# Patient Record
Sex: MALE | Race: WHITE
[De-identification: names, ages, dates, MRNs, and addresses within clinical notes are randomized per-mention and may not be internally consistent; named-entity substitution may affect disease eponyms.]

---

## 2018-10-30 ENCOUNTER — HOSPITAL ENCOUNTER (EMERGENCY)
Dept: HOSPITAL 92 - ERS | Age: 63
Discharge: HOME | End: 2018-10-30
Payer: SELF-PAY

## 2018-10-30 ENCOUNTER — HOSPITAL ENCOUNTER (EMERGENCY)
Dept: HOSPITAL 18 - NAV ERS | Age: 63
Discharge: ANOTHER HOSPITAL | End: 2018-10-30
Payer: SELF-PAY

## 2018-10-30 DIAGNOSIS — Z79.899: ICD-10-CM

## 2018-10-30 DIAGNOSIS — S00.03XA: ICD-10-CM

## 2018-10-30 DIAGNOSIS — I10: ICD-10-CM

## 2018-10-30 DIAGNOSIS — Z79.82: ICD-10-CM

## 2018-10-30 DIAGNOSIS — S22.42XA: Primary | ICD-10-CM

## 2018-10-30 DIAGNOSIS — E78.5: ICD-10-CM
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
## 2018-10-30 LAB
ALBUMIN SERPL BCG-MCNC: 4.5 G/DL (ref 3.4–4.8)
ALP SERPL-CCNC: 82 U/L (ref 40–150)
ALT SERPL W P-5'-P-CCNC: 38 U/L (ref 8–55)
ANION GAP SERPL CALC-SCNC: 15 MMOL/L (ref 10–20)
AST SERPL-CCNC: 31 U/L (ref 5–34)
BASOPHILS # BLD AUTO: 0.1 THOU/UL (ref 0–0.2)
BASOPHILS NFR BLD AUTO: 0.7 % (ref 0–1)
BILIRUB SERPL-MCNC: 0.5 MG/DL (ref 0.2–1.2)
BUN SERPL-MCNC: 15 MG/DL (ref 8.4–25.7)
CALCIUM SERPL-MCNC: 9.8 MG/DL (ref 7.8–10.44)
CHLORIDE SERPL-SCNC: 105 MMOL/L (ref 98–107)
CO2 SERPL-SCNC: 23 MMOL/L (ref 23–31)
CREAT CL PREDICTED SERPL C-G-VRATE: 0 ML/MIN (ref 70–130)
EOSINOPHIL # BLD AUTO: 0.1 THOU/UL (ref 0–0.7)
EOSINOPHIL NFR BLD AUTO: 0.5 % (ref 0–10)
GLOBULIN SER CALC-MCNC: 3.4 G/DL (ref 2.4–3.5)
GLUCOSE SERPL-MCNC: 115 MG/DL (ref 80–115)
HGB BLD-MCNC: 14.8 G/DL (ref 14–18)
INR PPP: 0.9
LYMPHOCYTES # BLD AUTO: 1.7 THOU/UL (ref 1.2–3.4)
LYMPHOCYTES NFR BLD AUTO: 13.7 % (ref 21–51)
MCH RBC QN AUTO: 28.5 PG (ref 27–31)
MCV RBC AUTO: 85.5 FL (ref 78–98)
MONOCYTES # BLD AUTO: 0.6 THOU/UL (ref 0.11–0.59)
MONOCYTES NFR BLD AUTO: 4.9 % (ref 0–10)
NEUTROPHILS # BLD AUTO: 9.8 THOU/UL (ref 1.4–6.5)
NEUTROPHILS NFR BLD AUTO: 80.2 % (ref 42–75)
PLATELET # BLD AUTO: 234 THOU/UL (ref 130–400)
POTASSIUM SERPL-SCNC: 3.8 MMOL/L (ref 3.5–5.1)
PROTHROMBIN TIME: 12.7 SEC (ref 12–14.7)
RBC # BLD AUTO: 5.19 MILL/UL (ref 4.7–6.1)
SODIUM SERPL-SCNC: 139 MMOL/L (ref 136–145)
WBC # BLD AUTO: 12.2 THOU/UL (ref 4.8–10.8)

## 2018-10-30 PROCEDURE — 85025 COMPLETE CBC W/AUTO DIFF WBC: CPT

## 2018-10-30 PROCEDURE — 96361 HYDRATE IV INFUSION ADD-ON: CPT

## 2018-10-30 PROCEDURE — 96374 THER/PROPH/DIAG INJ IV PUSH: CPT

## 2018-10-30 PROCEDURE — 96375 TX/PRO/DX INJ NEW DRUG ADDON: CPT

## 2018-10-30 PROCEDURE — 94640 AIRWAY INHALATION TREATMENT: CPT

## 2018-10-30 PROCEDURE — 80053 COMPREHEN METABOLIC PANEL: CPT

## 2018-10-30 PROCEDURE — 71250 CT THORAX DX C-: CPT

## 2018-10-30 PROCEDURE — 85610 PROTHROMBIN TIME: CPT

## 2018-10-30 PROCEDURE — 70450 CT HEAD/BRAIN W/O DYE: CPT
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
## 2019-10-03 ENCOUNTER — HOSPITAL ENCOUNTER (OUTPATIENT)
Dept: HOSPITAL 92 - BICMRI | Age: 64
Discharge: HOME | End: 2019-10-03
Attending: FAMILY MEDICINE
Payer: MEDICARE

## 2019-10-03 DIAGNOSIS — G44.319: Primary | ICD-10-CM

## 2019-10-03 LAB — ESTIMATED GFR-MDRD - POC: (no result)

## 2019-10-03 PROCEDURE — 82565 ASSAY OF CREATININE: CPT

## 2019-10-03 PROCEDURE — 70553 MRI BRAIN STEM W/O & W/DYE: CPT

## 2019-10-03 NOTE — MRI
MRI BRAIN WITH AND WITHOUT CONTRAST:

 

Date:  10/03/19

 

INDICATION:

Post-traumatic headache. 

 

Reference made to 10/30/18 head CT. 

 

FINDINGS:

There is no ventriculomegaly, mass effect, midline shift, or acute territorial infarction. No hemorrh
agic susceptibility of the brain parenchyma is present. No significant signal abnormalities are demon
strated. No pathologic intra-axial enhancement. Mild mucosal thickening of the paranasal sinuses. The
re is motion artifact that limits detail. 

 

IMPRESSION:

No acute intracranial abnormalities. 

 

 

POS: [facility]

## 2020-01-03 ENCOUNTER — HOSPITAL ENCOUNTER (OUTPATIENT)
Dept: HOSPITAL 92 - SLEEPLAB | Age: 65
Discharge: HOME | End: 2020-01-03
Attending: FAMILY MEDICINE
Payer: MEDICARE

## 2020-01-03 DIAGNOSIS — G47.10: ICD-10-CM

## 2020-01-03 DIAGNOSIS — J34.2: ICD-10-CM

## 2020-01-03 DIAGNOSIS — R35.1: ICD-10-CM

## 2020-01-03 DIAGNOSIS — R06.83: ICD-10-CM

## 2020-01-03 DIAGNOSIS — G47.33: Primary | ICD-10-CM

## 2020-01-03 DIAGNOSIS — I10: ICD-10-CM

## 2020-01-03 DIAGNOSIS — G47.00: ICD-10-CM

## 2020-01-03 PROCEDURE — 95811 POLYSOM 6/>YRS CPAP 4/> PARM: CPT

## 2021-01-26 ENCOUNTER — HOSPITAL ENCOUNTER (OUTPATIENT)
Dept: HOSPITAL 92 - NM | Age: 66
Discharge: HOME | End: 2021-01-26
Attending: INTERNAL MEDICINE
Payer: MEDICARE

## 2021-01-26 DIAGNOSIS — R10.11: Primary | ICD-10-CM

## 2021-01-26 PROCEDURE — A9537 TC99M MEBROFENIN: HCPCS

## 2021-01-26 PROCEDURE — 78227 HEPATOBIL SYST IMAGE W/DRUG: CPT

## 2021-01-26 NOTE — NM
EXAM:

NM Hida Scan W Drug



PROVIDED CLINICAL HISTORY:

Right upper quadrant abdominal pain. Bloating and diarrhea for 4 months.



COMPARISON:

7/20/2016



FINDINGS:

There is normal uptake and excretion of radiotracer by the liver. Gallbladder activity is faintly vis
ualized by 10 minutes with increasing activity in the gallbladder imaging up to 60 minutes. After

60 minutes of imaging, 8 ounces of ensure was administered. A gallbladder ejection fraction of 94% wa
s obtained. A normal gallbladder ejection fraction is greater than 33%. Gallbladder ejection

fraction on prior study was 95%.



IMPRESSION:



1. No evidence of a cystic or common duct obstruction.

2. Normal gallbladder ejection fraction.



Reported By: Ted Landers 

Electronically Signed:  1/26/2021 11:41 AM

## 2022-07-28 ENCOUNTER — HOSPITAL ENCOUNTER (OUTPATIENT)
Dept: HOSPITAL 92 - BICRAD | Age: 67
Discharge: HOME | End: 2022-07-28
Attending: FAMILY MEDICINE
Payer: MEDICARE

## 2022-07-28 DIAGNOSIS — R10.13: Primary | ICD-10-CM

## 2022-07-28 PROCEDURE — 71046 X-RAY EXAM CHEST 2 VIEWS: CPT

## 2024-05-02 ENCOUNTER — HOSPITAL ENCOUNTER (INPATIENT)
Dept: HOSPITAL 92 - ERS | Age: 69
LOS: 6 days | Discharge: HOME | DRG: 644 | End: 2024-05-08
Attending: INTERNAL MEDICINE | Admitting: STUDENT IN AN ORGANIZED HEALTH CARE EDUCATION/TRAINING PROGRAM
Payer: MEDICARE

## 2024-05-02 VITALS — BODY MASS INDEX: 36.6 KG/M2

## 2024-05-02 DIAGNOSIS — E26.01: Primary | ICD-10-CM

## 2024-05-02 DIAGNOSIS — E78.5: ICD-10-CM

## 2024-05-02 DIAGNOSIS — I70.1: ICD-10-CM

## 2024-05-02 DIAGNOSIS — Z98.890: ICD-10-CM

## 2024-05-02 DIAGNOSIS — D35.01: ICD-10-CM

## 2024-05-02 DIAGNOSIS — E87.6: ICD-10-CM

## 2024-05-02 DIAGNOSIS — I10: ICD-10-CM

## 2024-05-02 DIAGNOSIS — E66.9: ICD-10-CM

## 2024-05-02 DIAGNOSIS — I50.32: ICD-10-CM

## 2024-05-02 DIAGNOSIS — F10.10: ICD-10-CM

## 2024-05-02 DIAGNOSIS — I16.9: ICD-10-CM

## 2024-05-02 DIAGNOSIS — G47.30: ICD-10-CM

## 2024-05-02 DIAGNOSIS — Z79.82: ICD-10-CM

## 2024-05-02 DIAGNOSIS — G70.00: ICD-10-CM

## 2024-05-02 DIAGNOSIS — E87.21: ICD-10-CM

## 2024-05-02 DIAGNOSIS — Z79.899: ICD-10-CM

## 2024-05-02 DIAGNOSIS — I48.0: ICD-10-CM

## 2024-05-02 LAB
ALBUMIN SERPL BCG-MCNC: 3.7 G/DL (ref 3.4–4.8)
ALP SERPL-CCNC: 53 U/L (ref 40–110)
ALT SERPL W P-5'-P-CCNC: 19 U/L (ref 8–55)
ANION GAP SERPL CALC-SCNC: 16 MMOL/L (ref 10–20)
AST SERPL-CCNC: 23 U/L (ref 5–34)
BASOPHILS # BLD AUTO: 0.06 10X3/UL (ref 0–0.2)
BASOPHILS NFR BLD AUTO: 0.6 % (ref 0–1)
BILIRUB SERPL-MCNC: 0.7 MG/DL (ref 0.2–1.2)
BUN SERPL-MCNC: 19 MG/DL (ref 8.4–25.7)
CALCIUM SERPL-MCNC: 9.4 MG/DL (ref 7.8–10.44)
CAUTI INDICATIONS FOR CULTURE: (no result)
CHLORIDE SERPL-SCNC: 104 MMOL/L (ref 98–107)
CO2 SERPL-SCNC: 28 MMOL/L (ref 23–31)
CREAT CL PREDICTED SERPL C-G-VRATE: 0 ML/MIN (ref 70–130)
EOSINOPHIL # BLD AUTO: 0.2 10X3/UL (ref 0–0.7)
EOSINOPHIL NFR BLD AUTO: 2 % (ref 0–10)
GLOBULIN SER CALC-MCNC: 3.5 G/DL (ref 2.4–3.5)
GLUCOSE SERPL-MCNC: 111 MG/DL (ref 80–115)
HCT VFR BLD CALC: 40.1 % (ref 42–52)
HGB BLD-MCNC: 13.9 G/DL (ref 14–18)
LIPASE SERPL-CCNC: 81 U/L (ref 8–78)
LYMPHOCYTES NFR BLD AUTO: 27.1 % (ref 21–51)
MAGNESIUM SERPL-MCNC: 2 MG/DL (ref 1.6–2.6)
MCH RBC QN AUTO: 30.5 PG (ref 27–31)
MCV RBC AUTO: 88.1 FL (ref 78–98)
MONOCYTES # BLD AUTO: 0.7 10X3/UL (ref 0.11–0.59)
MONOCYTES NFR BLD AUTO: 7.2 % (ref 0–10)
NEUTROPHILS # BLD AUTO: 5.9 10X3/UL (ref 1.4–6.5)
NEUTROPHILS NFR BLD AUTO: 62.8 % (ref 42–75)
PLATELET # BLD AUTO: 202 10X3/UL (ref 130–400)
POTASSIUM SERPL-SCNC: 2.5 MMOL/L (ref 3.5–5.1)
RBC # BLD AUTO: 4.55 MILL/UL (ref 4.7–6.1)
RBC UR QL AUTO: (no result) HPF (ref 0–3)
SODIUM SERPL-SCNC: 145 MMOL/L (ref 136–145)
SP GR UR STRIP: 1.01 (ref 1–1.04)
TROPONIN I SERPL DL<=0.01 NG/ML-MCNC: 0.02 NG/ML (ref ?–0.03)
WBC # BLD AUTO: 9.5 10X3/UL (ref 4.8–10.8)
WBC UR QL AUTO: (no result) HPF (ref 0–3)

## 2024-05-02 PROCEDURE — 71045 X-RAY EXAM CHEST 1 VIEW: CPT

## 2024-05-02 PROCEDURE — 84133 ASSAY OF URINE POTASSIUM: CPT

## 2024-05-02 PROCEDURE — 84300 ASSAY OF URINE SODIUM: CPT

## 2024-05-02 PROCEDURE — 93005 ELECTROCARDIOGRAM TRACING: CPT

## 2024-05-02 PROCEDURE — 93306 TTE W/DOPPLER COMPLETE: CPT

## 2024-05-02 PROCEDURE — 74170 CT ABD WO CNTRST FLWD CNTRST: CPT

## 2024-05-02 PROCEDURE — 83835 ASSAY OF METANEPHRINES: CPT

## 2024-05-02 PROCEDURE — 93975 VASCULAR STUDY: CPT

## 2024-05-02 PROCEDURE — 80061 LIPID PANEL: CPT

## 2024-05-02 PROCEDURE — A9502 TC99M TETROFOSMIN: HCPCS

## 2024-05-02 PROCEDURE — 84244 ASSAY OF RENIN: CPT

## 2024-05-02 PROCEDURE — 78452 HT MUSCLE IMAGE SPECT MULT: CPT

## 2024-05-02 PROCEDURE — 80307 DRUG TEST PRSMV CHEM ANLYZR: CPT

## 2024-05-02 PROCEDURE — 94760 N-INVAS EAR/PLS OXIMETRY 1: CPT

## 2024-05-02 PROCEDURE — 83036 HEMOGLOBIN GLYCOSYLATED A1C: CPT

## 2024-05-02 PROCEDURE — 83735 ASSAY OF MAGNESIUM: CPT

## 2024-05-02 PROCEDURE — 96365 THER/PROPH/DIAG IV INF INIT: CPT

## 2024-05-02 PROCEDURE — 93017 CV STRESS TEST TRACING ONLY: CPT

## 2024-05-02 PROCEDURE — 83880 ASSAY OF NATRIURETIC PEPTIDE: CPT

## 2024-05-02 PROCEDURE — G0378 HOSPITAL OBSERVATION PER HR: HCPCS

## 2024-05-02 PROCEDURE — 36415 COLL VENOUS BLD VENIPUNCTURE: CPT

## 2024-05-02 PROCEDURE — 70450 CT HEAD/BRAIN W/O DYE: CPT

## 2024-05-02 PROCEDURE — 36416 COLLJ CAPILLARY BLOOD SPEC: CPT

## 2024-05-02 PROCEDURE — 84484 ASSAY OF TROPONIN QUANT: CPT

## 2024-05-02 PROCEDURE — 83690 ASSAY OF LIPASE: CPT

## 2024-05-02 PROCEDURE — 76770 US EXAM ABDO BACK WALL COMP: CPT

## 2024-05-02 PROCEDURE — 80048 BASIC METABOLIC PNL TOTAL CA: CPT

## 2024-05-02 PROCEDURE — 85025 COMPLETE CBC W/AUTO DIFF WBC: CPT

## 2024-05-02 PROCEDURE — 82088 ASSAY OF ALDOSTERONE: CPT

## 2024-05-02 PROCEDURE — 81001 URINALYSIS AUTO W/SCOPE: CPT

## 2024-05-02 PROCEDURE — 93880 EXTRACRANIAL BILAT STUDY: CPT

## 2024-05-02 PROCEDURE — 80053 COMPREHEN METABOLIC PANEL: CPT

## 2024-05-02 PROCEDURE — 96375 TX/PRO/DX INJ NEW DRUG ADDON: CPT

## 2024-05-03 LAB
ANION GAP SERPL CALC-SCNC: 13 MMOL/L (ref 10–20)
ANION GAP SERPL CALC-SCNC: 16 MMOL/L (ref 10–20)
BASOPHILS # BLD AUTO: 0.05 10X3/UL (ref 0–0.2)
BASOPHILS NFR BLD AUTO: 0.6 % (ref 0–1)
BUN SERPL-MCNC: 11 MG/DL (ref 8.4–25.7)
BUN SERPL-MCNC: 14 MG/DL (ref 8.4–25.7)
CALCIUM SERPL-MCNC: 9.1 MG/DL (ref 7.8–10.44)
CALCIUM SERPL-MCNC: 9.5 MG/DL (ref 7.8–10.44)
CHD RISK SERPL-RTO: 3 (ref ?–4.5)
CHLORIDE SERPL-SCNC: 104 MMOL/L (ref 98–107)
CHLORIDE SERPL-SCNC: 104 MMOL/L (ref 98–107)
CHOLEST SERPL-MCNC: 138 MG/DL
CO2 SERPL-SCNC: 27 MMOL/L (ref 23–31)
CO2 SERPL-SCNC: 29 MMOL/L (ref 23–31)
CREAT CL PREDICTED SERPL C-G-VRATE: 149 ML/MIN (ref 70–130)
CREAT CL PREDICTED SERPL C-G-VRATE: 149 ML/MIN (ref 70–130)
EOSINOPHIL # BLD AUTO: 0.2 10X3/UL (ref 0–0.7)
EOSINOPHIL NFR BLD AUTO: 2.3 % (ref 0–10)
GLUCOSE SERPL-MCNC: 126 MG/DL (ref 80–115)
GLUCOSE SERPL-MCNC: 139 MG/DL (ref 80–115)
HCT VFR BLD CALC: 38.2 % (ref 42–52)
HDLC SERPL-MCNC: 46 MG/DL
HGB BLD-MCNC: 13.5 G/DL (ref 14–18)
LDLC SERPL CALC-MCNC: 63 MG/DL
LYMPHOCYTES NFR BLD AUTO: 26.2 % (ref 21–51)
MCH RBC QN AUTO: 30.3 PG (ref 27–31)
MCV RBC AUTO: 85.7 FL (ref 78–98)
MONOCYTES # BLD AUTO: 0.6 10X3/UL (ref 0.11–0.59)
MONOCYTES NFR BLD AUTO: 6.8 % (ref 0–10)
NEUTROPHILS # BLD AUTO: 5.4 10X3/UL (ref 1.4–6.5)
NEUTROPHILS NFR BLD AUTO: 63.9 % (ref 42–75)
PLATELET # BLD AUTO: 181 10X3/UL (ref 130–400)
POTASSIUM SERPL-SCNC: 2.5 MMOL/L (ref 3.5–5.1)
POTASSIUM SERPL-SCNC: 2.8 MMOL/L (ref 3.5–5.1)
POTASSIUM SERPL-SCNC: 2.8 MMOL/L (ref 3.5–5.1)
RBC # BLD AUTO: 4.46 MILL/UL (ref 4.7–6.1)
SODIUM SERPL-SCNC: 143 MMOL/L (ref 136–145)
SODIUM SERPL-SCNC: 144 MMOL/L (ref 136–145)
TRIGL SERPL-MCNC: 145 MG/DL (ref ?–150)
TROPONIN I SERPL DL<=0.01 NG/ML-MCNC: 0.02 NG/ML (ref ?–0.03)
TROPONIN I SERPL DL<=0.01 NG/ML-MCNC: 0.02 NG/ML (ref ?–0.03)
WBC # BLD AUTO: 8.4 10X3/UL (ref 4.8–10.8)

## 2024-05-03 PROCEDURE — 5A09357 ASSISTANCE WITH RESPIRATORY VENTILATION, LESS THAN 24 CONSECUTIVE HOURS, CONTINUOUS POSITIVE AIRWAY PRESSURE: ICD-10-PCS | Performed by: INTERNAL MEDICINE

## 2024-05-03 RX ADMIN — FAMOTIDINE SCH: 10 INJECTION, SOLUTION INTRAVENOUS at 11:17

## 2024-05-03 RX ADMIN — ASPIRIN 81 MG CHEWABLE TABLET SCH MG: 81 TABLET CHEWABLE at 08:10

## 2024-05-03 RX ADMIN — MAGNESIUM SULFATE HEPTAHYDRATE SCH MLS: 40 INJECTION, SOLUTION INTRAVENOUS at 05:58

## 2024-05-03 RX ADMIN — PYRIDOSTIGMINE BROMIDE SCH: 60 TABLET ORAL at 09:00

## 2024-05-04 LAB
ANION GAP SERPL CALC-SCNC: 15 MMOL/L (ref 10–20)
BUN SERPL-MCNC: 15 MG/DL (ref 8.4–25.7)
CALCIUM SERPL-MCNC: 9.9 MG/DL (ref 7.8–10.44)
CHLORIDE SERPL-SCNC: 110 MMOL/L (ref 98–107)
CO2 SERPL-SCNC: 24 MMOL/L (ref 23–31)
CREAT CL PREDICTED SERPL C-G-VRATE: 112 ML/MIN (ref 70–130)
GLUCOSE SERPL-MCNC: 118 MG/DL (ref 80–115)
MAGNESIUM SERPL-MCNC: 2.6 MG/DL (ref 1.6–2.6)
POTASSIUM SERPL-SCNC: 3.1 MMOL/L (ref 3.5–5.1)
POTASSIUM SERPL-SCNC: 3.7 MMOL/L (ref 3.5–5.1)
SODIUM SERPL-SCNC: 145 MMOL/L (ref 136–145)

## 2024-05-05 LAB
ANION GAP SERPL CALC-SCNC: 12 MMOL/L (ref 10–20)
ANION GAP SERPL CALC-SCNC: 15 MMOL/L (ref 10–20)
BASOPHILS # BLD AUTO: 0.05 10X3/UL (ref 0–0.2)
BASOPHILS NFR BLD AUTO: 0.6 % (ref 0–1)
BUN SERPL-MCNC: 16 MG/DL (ref 8.4–25.7)
BUN SERPL-MCNC: 16 MG/DL (ref 8.4–25.7)
CALCIUM SERPL-MCNC: 9.5 MG/DL (ref 7.8–10.44)
CALCIUM SERPL-MCNC: 9.5 MG/DL (ref 7.8–10.44)
CHLORIDE SERPL-SCNC: 109 MMOL/L (ref 98–107)
CHLORIDE SERPL-SCNC: 111 MMOL/L (ref 98–107)
CO2 SERPL-SCNC: 17 MMOL/L (ref 23–31)
CO2 SERPL-SCNC: 24 MMOL/L (ref 23–31)
CREAT CL PREDICTED SERPL C-G-VRATE: 138 ML/MIN (ref 70–130)
CREAT CL PREDICTED SERPL C-G-VRATE: 143 ML/MIN (ref 70–130)
EOSINOPHIL # BLD AUTO: 0.3 10X3/UL (ref 0–0.7)
EOSINOPHIL NFR BLD AUTO: 3.5 % (ref 0–10)
GLUCOSE SERPL-MCNC: 111 MG/DL (ref 80–115)
GLUCOSE SERPL-MCNC: 116 MG/DL (ref 80–115)
HCT VFR BLD CALC: 41.3 % (ref 42–52)
HGB BLD-MCNC: 14.3 G/DL (ref 14–18)
LYMPHOCYTES NFR BLD AUTO: 27.8 % (ref 21–51)
MAGNESIUM SERPL-MCNC: 2.4 MG/DL (ref 1.6–2.6)
MCH RBC QN AUTO: 30.8 PG (ref 27–31)
MCV RBC AUTO: 88.8 FL (ref 78–98)
MONOCYTES # BLD AUTO: 0.7 10X3/UL (ref 0.11–0.59)
MONOCYTES NFR BLD AUTO: 7.3 % (ref 0–10)
NEUTROPHILS # BLD AUTO: 5.4 10X3/UL (ref 1.4–6.5)
NEUTROPHILS NFR BLD AUTO: 60.5 % (ref 42–75)
PLATELET # BLD AUTO: 210 10X3/UL (ref 130–400)
POTASSIUM SERPL-SCNC: 3.1 MMOL/L (ref 3.5–5.1)
POTASSIUM SERPL-SCNC: 4.1 MMOL/L (ref 3.5–5.1)
RBC # BLD AUTO: 4.65 MILL/UL (ref 4.7–6.1)
SODIUM SERPL-SCNC: 139 MMOL/L (ref 136–145)
SODIUM SERPL-SCNC: 142 MMOL/L (ref 136–145)
WBC # BLD AUTO: 9 10X3/UL (ref 4.8–10.8)

## 2024-05-05 RX ADMIN — PYRIDOSTIGMINE BROMIDE SCH MG: 60 TABLET ORAL at 08:55

## 2024-05-06 LAB
ANION GAP SERPL CALC-SCNC: 15 MMOL/L (ref 10–20)
BASOPHILS # BLD AUTO: 0.06 10X3/UL (ref 0–0.2)
BASOPHILS NFR BLD AUTO: 0.6 % (ref 0–1)
BUN SERPL-MCNC: 13 MG/DL (ref 8.4–25.7)
CALCIUM SERPL-MCNC: 9.5 MG/DL (ref 7.8–10.44)
CHLORIDE SERPL-SCNC: 108 MMOL/L (ref 98–107)
CO2 SERPL-SCNC: 22 MMOL/L (ref 23–31)
CREAT CL PREDICTED SERPL C-G-VRATE: 141 ML/MIN (ref 70–130)
EOSINOPHIL # BLD AUTO: 0.3 10X3/UL (ref 0–0.7)
EOSINOPHIL NFR BLD AUTO: 3 % (ref 0–10)
GLUCOSE SERPL-MCNC: 114 MG/DL (ref 80–115)
HCT VFR BLD CALC: 40.9 % (ref 42–52)
HGB BLD-MCNC: 14.4 G/DL (ref 14–18)
LYMPHOCYTES NFR BLD AUTO: 22.3 % (ref 21–51)
MAGNESIUM SERPL-MCNC: 2.2 MG/DL (ref 1.6–2.6)
MCH RBC QN AUTO: 31 PG (ref 27–31)
MCV RBC AUTO: 88.1 FL (ref 78–98)
MONOCYTES # BLD AUTO: 0.7 10X3/UL (ref 0.11–0.59)
MONOCYTES NFR BLD AUTO: 7.2 % (ref 0–10)
NEUTROPHILS # BLD AUTO: 6.2 10X3/UL (ref 1.4–6.5)
NEUTROPHILS NFR BLD AUTO: 66.7 % (ref 42–75)
PLATELET # BLD AUTO: 207 10X3/UL (ref 130–400)
POTASSIUM SERPL-SCNC: 3.5 MMOL/L (ref 3.5–5.1)
RBC # BLD AUTO: 4.64 MILL/UL (ref 4.7–6.1)
SODIUM SERPL-SCNC: 141 MMOL/L (ref 136–145)
WBC # BLD AUTO: 9.3 10X3/UL (ref 4.8–10.8)

## 2024-05-06 RX ADMIN — NIFEDIPINE SCH MG: 60 TABLET, EXTENDED RELEASE ORAL at 17:24

## 2024-05-07 LAB
ANION GAP SERPL CALC-SCNC: 14 MMOL/L (ref 10–20)
BASOPHILS # BLD AUTO: 0.05 10X3/UL (ref 0–0.2)
BASOPHILS NFR BLD AUTO: 0.5 % (ref 0–1)
BUN SERPL-MCNC: 19 MG/DL (ref 8.4–25.7)
CALCIUM SERPL-MCNC: 9.4 MG/DL (ref 7.8–10.44)
CHLORIDE SERPL-SCNC: 108 MMOL/L (ref 98–107)
CO2 SERPL-SCNC: 22 MMOL/L (ref 23–31)
CREAT CL PREDICTED SERPL C-G-VRATE: 133 ML/MIN (ref 70–130)
EOSINOPHIL # BLD AUTO: 0.2 10X3/UL (ref 0–0.7)
EOSINOPHIL NFR BLD AUTO: 2.4 % (ref 0–10)
GLUCOSE SERPL-MCNC: 104 MG/DL (ref 80–115)
HCT VFR BLD CALC: 41.2 % (ref 42–52)
HGB BLD-MCNC: 13.7 G/DL (ref 14–18)
LYMPHOCYTES NFR BLD AUTO: 27.4 % (ref 21–51)
MAGNESIUM SERPL-MCNC: 2.2 MG/DL (ref 1.6–2.6)
MCH RBC QN AUTO: 29.9 PG (ref 27–31)
MCV RBC AUTO: 90 FL (ref 78–98)
MONOCYTES # BLD AUTO: 0.8 10X3/UL (ref 0.11–0.59)
MONOCYTES NFR BLD AUTO: 8.5 % (ref 0–10)
NEUTROPHILS # BLD AUTO: 5.9 10X3/UL (ref 1.4–6.5)
NEUTROPHILS NFR BLD AUTO: 61 % (ref 42–75)
PLATELET # BLD AUTO: 223 10X3/UL (ref 130–400)
POTASSIUM SERPL-SCNC: 3.4 MMOL/L (ref 3.5–5.1)
POTASSIUM SERPL-SCNC: 4 MMOL/L (ref 3.5–5.1)
RBC # BLD AUTO: 4.58 MILL/UL (ref 4.7–6.1)
SODIUM SERPL-SCNC: 141 MMOL/L (ref 136–145)
WBC # BLD AUTO: 9.7 10X3/UL (ref 4.8–10.8)

## 2024-05-07 RX ADMIN — NIFEDIPINE SCH MG: 60 TABLET, EXTENDED RELEASE ORAL at 09:29

## 2024-05-08 VITALS — SYSTOLIC BLOOD PRESSURE: 140 MMHG | DIASTOLIC BLOOD PRESSURE: 78 MMHG | TEMPERATURE: 98.7 F

## 2024-05-08 LAB
ANION GAP SERPL CALC-SCNC: 13 MMOL/L (ref 10–20)
BASOPHILS # BLD AUTO: 0.05 10X3/UL (ref 0–0.2)
BASOPHILS NFR BLD AUTO: 0.5 % (ref 0–1)
BUN SERPL-MCNC: 19 MG/DL (ref 8.4–25.7)
CALCIUM SERPL-MCNC: 9.5 MG/DL (ref 7.8–10.44)
CHLORIDE SERPL-SCNC: 109 MMOL/L (ref 98–107)
CO2 SERPL-SCNC: 20 MMOL/L (ref 23–31)
CREAT CL PREDICTED SERPL C-G-VRATE: 136 ML/MIN (ref 70–130)
EOSINOPHIL # BLD AUTO: 0.3 10X3/UL (ref 0–0.7)
EOSINOPHIL NFR BLD AUTO: 3.1 % (ref 0–10)
GLUCOSE SERPL-MCNC: 113 MG/DL (ref 80–115)
HCT VFR BLD CALC: 40 % (ref 42–52)
HGB BLD-MCNC: 13.6 G/DL (ref 14–18)
LYMPHOCYTES NFR BLD AUTO: 23.6 % (ref 21–51)
MAGNESIUM SERPL-MCNC: 2.1 MG/DL (ref 1.6–2.6)
MCH RBC QN AUTO: 30.7 PG (ref 27–31)
MCV RBC AUTO: 90.3 FL (ref 78–98)
MONOCYTES # BLD AUTO: 0.9 10X3/UL (ref 0.11–0.59)
MONOCYTES NFR BLD AUTO: 8.5 % (ref 0–10)
NEUTROPHILS # BLD AUTO: 6.4 10X3/UL (ref 1.4–6.5)
NEUTROPHILS NFR BLD AUTO: 64 % (ref 42–75)
PLATELET # BLD AUTO: 210 10X3/UL (ref 130–400)
POTASSIUM SERPL-SCNC: 3.6 MMOL/L (ref 3.5–5.1)
RBC # BLD AUTO: 4.43 MILL/UL (ref 4.7–6.1)
SODIUM SERPL-SCNC: 138 MMOL/L (ref 136–145)
WBC # BLD AUTO: 10.1 10X3/UL (ref 4.8–10.8)

## 2024-05-08 RX ADMIN — SODIUM BICARBONATE SCH MG: 325 TABLET ORAL at 09:15

## 2024-05-08 RX ADMIN — NIFEDIPINE SCH MG: 30 TABLET, FILM COATED, EXTENDED RELEASE ORAL at 09:22

## 2024-05-10 LAB
METANEPH FREE SERPL-MCNC: <25 PG/ML (ref 0–88)
NORMETANEPHRINE SERPL-MCNC: 41.3 PG/ML (ref 0–285.2)

## 2024-10-06 ENCOUNTER — HOSPITAL ENCOUNTER (EMERGENCY)
Dept: HOSPITAL 92 - ERS | Age: 69
Discharge: HOME | End: 2024-10-06
Payer: MEDICARE

## 2024-10-06 DIAGNOSIS — I50.9: ICD-10-CM

## 2024-10-06 DIAGNOSIS — R42: Primary | ICD-10-CM

## 2024-10-06 DIAGNOSIS — Z79.899: ICD-10-CM

## 2024-10-06 DIAGNOSIS — I11.0: ICD-10-CM

## 2024-10-06 DIAGNOSIS — E78.5: ICD-10-CM

## 2024-10-06 LAB
ALBUMIN SERPL BCG-MCNC: 3.9 G/DL (ref 3.4–4.8)
ALP SERPL-CCNC: 73 U/L (ref 40–110)
ALT SERPL W P-5'-P-CCNC: 22 U/L (ref 8–55)
ANION GAP SERPL CALC-SCNC: 12 MMOL/L (ref 10–20)
AST SERPL-CCNC: 19 U/L (ref 5–34)
BASOPHILS # BLD AUTO: 0.05 10X3/UL (ref 0–0.2)
BASOPHILS NFR BLD AUTO: 0.6 % (ref 0–1)
BILIRUB SERPL-MCNC: 0.6 MG/DL (ref 0.2–1.2)
BUN SERPL-MCNC: 32 MG/DL (ref 8.4–25.7)
CALCIUM SERPL-MCNC: 9.9 MG/DL (ref 7.8–10.44)
CHLORIDE SERPL-SCNC: 109 MMOL/L (ref 98–107)
CO2 SERPL-SCNC: 22 MMOL/L (ref 23–31)
CREAT CL PREDICTED SERPL C-G-VRATE: 0 ML/MIN (ref 70–130)
EOSINOPHIL # BLD AUTO: 0.3 10X3/UL (ref 0–0.7)
EOSINOPHIL NFR BLD AUTO: 3.5 % (ref 0–10)
GLOBULIN SER CALC-MCNC: 3 G/DL (ref 2.4–3.5)
GLUCOSE SERPL-MCNC: 119 MG/DL (ref 80–115)
HCT VFR BLD CALC: 39.6 % (ref 42–52)
HGB BLD-MCNC: 13.2 G/DL (ref 14–18)
LYMPHOCYTES NFR BLD AUTO: 26.2 % (ref 21–51)
MCH RBC QN AUTO: 30 PG (ref 27–31)
MCV RBC AUTO: 90 FL (ref 78–98)
MONOCYTES # BLD AUTO: 0.8 10X3/UL (ref 0.11–0.59)
MONOCYTES NFR BLD AUTO: 8.8 % (ref 0–10)
NEUTROPHILS # BLD AUTO: 5.1 10X3/UL (ref 1.4–6.5)
NEUTROPHILS NFR BLD AUTO: 60.4 % (ref 42–75)
PLATELET # BLD AUTO: 195 10X3/UL (ref 130–400)
POTASSIUM SERPL-SCNC: 3.9 MMOL/L (ref 3.5–5.1)
RBC # BLD AUTO: 4.4 MILL/UL (ref 4.7–6.1)
SODIUM SERPL-SCNC: 139 MMOL/L (ref 136–145)
TROPONIN I SERPL DL<=0.01 NG/ML-MCNC: (no result) NG/ML (ref ?–0.03)
WBC # BLD AUTO: 8.5 10X3/UL (ref 4.8–10.8)

## 2024-10-06 PROCEDURE — 36415 COLL VENOUS BLD VENIPUNCTURE: CPT

## 2024-10-06 PROCEDURE — 80053 COMPREHEN METABOLIC PANEL: CPT

## 2024-10-06 PROCEDURE — 93005 ELECTROCARDIOGRAM TRACING: CPT

## 2024-10-06 PROCEDURE — 96375 TX/PRO/DX INJ NEW DRUG ADDON: CPT

## 2024-10-06 PROCEDURE — 96374 THER/PROPH/DIAG INJ IV PUSH: CPT

## 2024-10-06 PROCEDURE — 84484 ASSAY OF TROPONIN QUANT: CPT

## 2024-10-06 PROCEDURE — 71045 X-RAY EXAM CHEST 1 VIEW: CPT

## 2024-10-06 PROCEDURE — 70450 CT HEAD/BRAIN W/O DYE: CPT

## 2024-10-06 PROCEDURE — 85025 COMPLETE CBC W/AUTO DIFF WBC: CPT

## 2024-12-20 ENCOUNTER — HOSPITAL ENCOUNTER (EMERGENCY)
Dept: HOSPITAL 92 - ERS | Age: 69
LOS: 1 days | Discharge: HOME | End: 2024-12-21
Payer: MEDICARE

## 2024-12-20 DIAGNOSIS — Z79.82: ICD-10-CM

## 2024-12-20 DIAGNOSIS — I11.0: Primary | ICD-10-CM

## 2024-12-20 DIAGNOSIS — I48.91: ICD-10-CM

## 2024-12-20 DIAGNOSIS — E78.5: ICD-10-CM

## 2024-12-20 DIAGNOSIS — Z79.899: ICD-10-CM

## 2024-12-20 DIAGNOSIS — I50.9: ICD-10-CM

## 2024-12-20 DIAGNOSIS — Z79.01: ICD-10-CM

## 2024-12-20 LAB
BASOPHILS # BLD AUTO: 0.07 10X3/UL (ref 0–0.2)
BASOPHILS NFR BLD AUTO: 0.7 % (ref 0–1)
EOSINOPHIL # BLD AUTO: 0.4 10X3/UL (ref 0–0.7)
EOSINOPHIL NFR BLD AUTO: 3.5 % (ref 0–10)
HCT VFR BLD CALC: 42.9 % (ref 42–52)
HGB BLD-MCNC: 14.7 G/DL (ref 14–18)
LYMPHOCYTES NFR BLD AUTO: 21.4 % (ref 21–51)
MCH RBC QN AUTO: 30.1 PG (ref 27–31)
MCV RBC AUTO: 87.9 FL (ref 78–98)
MONOCYTES # BLD AUTO: 0.8 10X3/UL (ref 0.11–0.59)
MONOCYTES NFR BLD AUTO: 7.8 % (ref 0–10)
NEUTROPHILS # BLD AUTO: 7 10X3/UL (ref 1.4–6.5)
NEUTROPHILS NFR BLD AUTO: 66.2 % (ref 42–75)
PLATELET # BLD AUTO: 199 10X3/UL (ref 130–400)
RBC # BLD AUTO: 4.88 MILL/UL (ref 4.7–6.1)
WBC # BLD AUTO: 10.5 10X3/UL (ref 4.8–10.8)

## 2024-12-20 PROCEDURE — 80053 COMPREHEN METABOLIC PANEL: CPT

## 2024-12-20 PROCEDURE — 71045 X-RAY EXAM CHEST 1 VIEW: CPT

## 2024-12-20 PROCEDURE — 93005 ELECTROCARDIOGRAM TRACING: CPT

## 2024-12-20 PROCEDURE — 85025 COMPLETE CBC W/AUTO DIFF WBC: CPT

## 2024-12-20 PROCEDURE — 84484 ASSAY OF TROPONIN QUANT: CPT

## 2024-12-21 LAB
ALBUMIN SERPL BCG-MCNC: 4.2 G/DL (ref 3.4–4.8)
ALP SERPL-CCNC: 90 U/L (ref 40–110)
ALT SERPL W P-5'-P-CCNC: 30 U/L (ref 8–55)
ANION GAP SERPL CALC-SCNC: 15 MMOL/L (ref 10–20)
AST SERPL-CCNC: 23 U/L (ref 5–34)
BILIRUB SERPL-MCNC: 0.6 MG/DL (ref 0.2–1.2)
BUN SERPL-MCNC: 22 MG/DL (ref 8.4–25.7)
CALCIUM SERPL-MCNC: 9.8 MG/DL (ref 7.8–10.44)
CHLORIDE SERPL-SCNC: 110 MMOL/L (ref 98–107)
CO2 SERPL-SCNC: 20 MMOL/L (ref 23–31)
CREAT CL PREDICTED SERPL C-G-VRATE: 0 ML/MIN (ref 70–130)
GLOBULIN SER CALC-MCNC: 3.7 G/DL (ref 2.4–3.5)
GLUCOSE SERPL-MCNC: 105 MG/DL (ref 80–115)
POTASSIUM SERPL-SCNC: 4.1 MMOL/L (ref 3.5–5.1)
SODIUM SERPL-SCNC: 141 MMOL/L (ref 136–145)
TROPONIN I SERPL DL<=0.01 NG/ML-MCNC: (no result) NG/ML (ref ?–0.03)